# Patient Record
(demographics unavailable — no encounter records)

---

## 2025-01-02 NOTE — PHYSICAL EXAM
[de-identified] : well healed scar [de-identified] : no palpable thyroid nodules [Laryngoscopy Performed] : laryngoscopy was performed, see procedure section for findings [Midline] : located in midline position [Normal] : orientation to person, place, and time: normal [de-identified] : no palpable masses

## 2025-01-02 NOTE — HISTORY OF PRESENT ILLNESS
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  denies pain, drainage infection, new lesions.    no changes medically since last visit.  recent MRI [facility].  I have reviewed all old and new data and available images.  seen by ENT for decreased hearing left ear, but nothing needed to be done.

## 2025-01-02 NOTE — ASSESSMENT
[FreeTextEntry1] : will observe. MRI at 5 years. to return earlier if any change. patient has been given the opportunity to ask questions, and all of the patient's questions have been answered to their satisfaction